# Patient Record
Sex: FEMALE | Race: WHITE | NOT HISPANIC OR LATINO | URBAN - METROPOLITAN AREA
[De-identification: names, ages, dates, MRNs, and addresses within clinical notes are randomized per-mention and may not be internally consistent; named-entity substitution may affect disease eponyms.]

---

## 2017-06-27 ENCOUNTER — HOSPITAL ENCOUNTER (OUTPATIENT)
Dept: OBGYN | Facility: HOSPITAL | Age: 32
Discharge: HOME | End: 2017-06-27
Attending: OBSTETRICS & GYNECOLOGY | Admitting: ADVANCED PRACTICE MIDWIFE

## 2017-06-27 RX ORDER — CLINDAMYCIN PHOSPHATE 10 MG/G
GEL TOPICAL 2 TIMES DAILY
Status: SHIPPED | COMMUNITY
Start: 2017-06-27

## 2017-06-27 RX ORDER — SWAB
1 SWAB, NON-MEDICATED MISCELLANEOUS DAILY
Status: SHIPPED | COMMUNITY
Start: 2017-06-27

## 2017-06-27 ASSESSMENT — MIFFLIN-ST. JEOR: SCORE: 1673.38

## 2017-07-31 ENCOUNTER — HOSPITAL ENCOUNTER (OUTPATIENT)
Dept: OBGYN | Facility: HOSPITAL | Age: 32
Discharge: HOME | End: 2017-07-31
Attending: OBSTETRICS & GYNECOLOGY | Admitting: OBSTETRICS & GYNECOLOGY

## 2017-07-31 ASSESSMENT — MIFFLIN-ST. JEOR: SCORE: 1700.59

## 2017-08-04 ENCOUNTER — RECORDS - HEALTHEAST (OUTPATIENT)
Dept: ADMINISTRATIVE | Facility: OTHER | Age: 32
End: 2017-08-04

## 2017-08-10 ENCOUNTER — RECORDS - HEALTHEAST (OUTPATIENT)
Dept: ADMINISTRATIVE | Facility: OTHER | Age: 32
End: 2017-08-10

## 2017-08-24 ENCOUNTER — ANESTHESIA - HEALTHEAST (OUTPATIENT)
Dept: OBGYN | Facility: HOSPITAL | Age: 32
End: 2017-08-24

## 2017-08-25 ENCOUNTER — SURGERY - HEALTHEAST (OUTPATIENT)
Dept: OBGYN | Facility: HOSPITAL | Age: 32
End: 2017-08-25

## 2017-08-25 ASSESSMENT — MIFFLIN-ST. JEOR: SCORE: 1759.56

## 2017-08-29 ENCOUNTER — COMMUNICATION - HEALTHEAST (OUTPATIENT)
Dept: OBGYN | Facility: HOSPITAL | Age: 32
End: 2017-08-29

## 2017-11-07 ENCOUNTER — COMMUNICATION - HEALTHEAST (OUTPATIENT)
Dept: FAMILY MEDICINE | Facility: CLINIC | Age: 32
End: 2017-11-07

## 2017-11-15 ENCOUNTER — OFFICE VISIT - HEALTHEAST (OUTPATIENT)
Dept: FAMILY MEDICINE | Facility: CLINIC | Age: 32
End: 2017-11-15

## 2017-11-15 DIAGNOSIS — N89.8 VAGINAL CYST: ICD-10-CM

## 2017-11-15 DIAGNOSIS — Z01.818 PREOP EXAMINATION: ICD-10-CM

## 2017-11-15 ASSESSMENT — MIFFLIN-ST. JEOR: SCORE: 1579.26

## 2021-05-31 VITALS — HEIGHT: 64 IN | WEIGHT: 238 LBS | BODY MASS INDEX: 40.63 KG/M2

## 2021-05-31 VITALS — WEIGHT: 219 LBS | HEIGHT: 64 IN | BODY MASS INDEX: 37.39 KG/M2

## 2021-05-31 VITALS — HEIGHT: 64 IN | BODY MASS INDEX: 38.41 KG/M2 | WEIGHT: 225 LBS

## 2021-05-31 VITALS — WEIGHT: 198.25 LBS | HEIGHT: 64 IN | BODY MASS INDEX: 33.84 KG/M2

## 2021-06-12 NOTE — ANESTHESIA POSTPROCEDURE EVALUATION
Patient: Sahara Rios  PRIMARY  SECTION  Anesthesia type: spinal    Patient location: [facility]  Last vitals:   Vitals:    17 0748   BP: 124/67   Pulse: (!) 58   Resp: 20   Temp: 36.7  C (98.1  F)   SpO2: 98%     Post vital signs: stable  Level of consciousness: awake and responds to simple questions  Post-anesthesia pain: pain controlled  Post-anesthesia nausea and vomiting: no  Pulmonary: unassisted, return to baseline  Cardiovascular: stable and blood pressure at baseline  Hydration: adequate  Anesthetic events: no    QCDR Measures:  ASA# 11 - Melinda-op Cardiac Arrest: ASA11B - Patient did NOT experience unanticipated cardiac arrest  ASA# 12 - Melinda-op Mortality Rate: ASA12B - Patient did NOT die  ASA# 13 - PACU Re-Intubation Rate: ASA13B - Patient did NOT require a new airway mgmt  ASA# 10 - Composite Anes Safety: ASA10A - No serious adverse event  ASA# 38 - New Corneal Injury: ASA38A - No new exposure keratitis or corneal abrasion in PACU    Additional Notes:

## 2021-06-12 NOTE — ANESTHESIA PROCEDURE NOTES
Spinal Block    Start time: 8/25/2017 7:40 AM  End time: 8/25/2017 7:42 AM  Reason for block: labor epidural    Staffing:  Performing  Anesthesiologist: CASEY VILLASENOR    Preanesthetic Checklist  Completed: patient identified, risks, benefits, and alternatives discussed, timeout performed, consent obtained, at patient's request, airway assessed, oxygen available, suction available, emergency drugs available and hand hygiene performed  Spinal Block  Patient position: sitting  Prep: ChloraPrep  Patient monitoring: heart rate, cardiac monitor, continuous pulse ox and blood pressure  Approach: right paramedian  Location: L3-4  Injection technique: single-shot  Needle type: pencil-tip   Needle gauge: 24 G    Assessment  Sensory level: T8    Additional Notes:  stone tay

## 2021-06-12 NOTE — ANESTHESIA PREPROCEDURE EVALUATION
Anesthesia Evaluation      Patient summary reviewed   No history of anesthetic complications     Airway   Mallampati: II  Neck ROM: full   Pulmonary - negative ROS and normal exam                          Cardiovascular - normal exam  Exercise tolerance: > or = 4 METS  Rhythm: regular  Rate: normal,         Neuro/Psych - negative ROS     Endo/Other    (+) obesity, pregnant     GI/Hepatic/Renal - negative ROS           Dental - normal exam                        Anesthesia Plan  Planned anesthetic: spinal    ASA 2     Anesthetic plan and risks discussed with: patient    Post-op plan: routine recovery

## 2021-06-12 NOTE — ANESTHESIA CARE TRANSFER NOTE
Last vitals:   Vitals:    08/25/17 0857   BP: 115/66   Pulse: 72   Resp: 16   Temp: 36.4  C (97.5  F)     Patient's level of consciousness is awake  Spontaneous respirations: yes  Maintains airway independently: yes  Dentition unchanged: yes  Oropharynx: oropharynx clear of all foreign objects    QCDR Measures:  ASA# 20 - Surgical Safety Checklist: WHO surgical safety checklist completed prior to induction  PQRS# 430 - Adult PONV Prevention: 4558F - Pt received => 2 anti-emetic agents (different classes) preop & intraop  ASA# 8 - Peds PONV Prevention: NA - Not pediatric patient, not GA or 2 or more risk factors NOT present  PQRS# 424 - Melinda-op Temp Management: 4559F - At least one body temp DOCUMENTED => 35.5C or 95.9F within required timeframe  PQRS# 426 - PACU Transfer Protocol: - Transfer of care checklist used  ASA# 14 - Acute Post-op Pain: ASA14B - Patient did NOT experience pain >= 7 out of 10

## 2021-06-14 NOTE — PROGRESS NOTES
PREOPERATIVE HISTORY AND PHYSICAL EXAM   Patient: Sahara Rios   1985       MRN [id number] PCP: Mya De Leon MD  CLINIC PHONE: [phone]       SUBJECTIVE  Patient is being seen today for Preoperative Consultation at the request of Dr. Tong for the underlying condition of vaginal cyst.  Surgery Date:  17  Scheduled Surgery: cyst removal  Surgery Location: [facility]    Exam Date:  11/15/2017  Primary Provider: Mya De Leon MD    HPI:   Sahraa Rios is a 32 y.o. female here for preop exam.   She is a patient of Dr. De Leon.  PMH notable for allergies, pilonidal cyst, hidradenitis, otherwise negative.  She gave birth in August via  section and has been doing well.  Returns to work next week.     PAST MEDICAL:   Patient Active Problem List    Diagnosis Date Noted      delivery, delivered, current hospitalization 2017     Closed Fracture Of The Left Thumb MCP Joint      Overview Note:     Created by Conversion         Finger Injury      Overview Note:     Created by Conversion  [employer] Annotation: 2014  9:16AM - Юлия Barkeris: left thumb         Myalgia And Myositis      Overview Note:     Created by Conversion         Adenoid Hypertrophy      Overview Note:     Created by Conversion         Hypertrophied Nasal Turbinate      Overview Note:     Created by Conversion         Coccydynia      Overview Note:     Created by Conversion         Allergic Rhinitis      Overview Note:     Created by Conversion  [facility] Annotation: Sep 25 2011  7:20PM - Юлия Barkeris: decreased nasal   sx since septoplasty    Replacement Utility updated for latest IMO load       Pilonidal Cyst      Overview Note:     Created by Conversion    Replacement Utility updated for latest IMO load       Acute Sore Throat      Overview Note:     Created by Conversion         Acute Upper Respiratory Infection      Overview Note:     Created by  Conversion    Replacement Utility updated for latest IMO load       Pap Smear (+) Low Grade Squamous Intraepithelial Lesion      Overview Note:     Created by Conversion           PAST SURGICAL:     Past Surgical History:   Procedure Laterality Date      SECTION N/A 2017    Procedure: PRIMARY  SECTION;  Surgeon: Alona Tong MD;  Location: [facility];  Service:      DE REMOVAL ADENOIDS,PRIMARY,<13 Y/O      Description: Adenoidectomy;  Recorded: 2013;     DE REMV PILONIDAL LESION SIMPLE      Description: Pilonidal Cyst Resection;  Recorded: 2014;     DE REPAIR OF NASAL SEPTUM      Description: Septoplasty;  Recorded: 2011;       Personal or Family History of Anesthesia Reactions: none  Personal or Family History of Bleeding Disorders: none    IMMUNIZATIONS  Immunization History   Administered Date(s) Administered     Hep B, historic 1900     Pneumo Polysac 23-V 2011     Tdap 2011, 2017       CURRENT MEDICATIONS   Current Outpatient Prescriptions   Medication Sig Dispense Refill     prenatal vitamin iron-folic acid 27mg-0.8mg (PRENATAL S) 27 mg iron- 800 mcg Tab tablet Take 1 tablet by mouth daily.       clindamycin (CLINDAGEL) 1 % gel Apply topically 2 (two) times a day.       docusate sodium (COLACE) 50 MG capsule Take by mouth 2 (two) times a day.       oxyCODONE-acetaminophen (PERCOCET) 5-325 mg per tablet Take 1-2 tablets by mouth every 4 (four) hours as needed. 30 tablet 0     pantoprazole (PROTONIX) 20 MG tablet Take 20 mg by mouth daily.       No current facility-administered medications for this visit.        Recent NSAID/Aspirin use: none    ALLERGIES  No latex allergies.  Allergies   Allergen Reactions     Trazodone Hives       HABITS & SOCIAL HISTORY:  She  reports that she has been smoking Cigarettes.  She has a 2.00 pack-year smoking history. She has quit using smokeless tobacco.  She  reports that she does not drink  "alcohol.  History   Sexual Activity     Sexual activity: Yes     Partners: Male     Birth control/ protection: None       FAMILY HISTORY:  Her family history is not on file.      REVIEW OF SYSTEMS:  Remainder of complete head to toe negative.   CARDIOVASCULAR: Negative for CAD, CHF, Valvular Disease, HTN, Atrial Fibrillation, Other Arrhythmia, ICD/Pacer, Peripheral Vascular Disease.  PULMONARY: Negative for COPD, Asthma, Apnea.  RENAL: Negative for Chronic diuretic use, Renal Failure, Dialysis.  ENDOCRINE: Negative for Diabetes, Chronic steroid use, Thyroid Disease.  GI: Negative for GERD, Hiatal Hernia, Hepatic Disease.  NEURO: Negative for CVA, TIA, Seizures, Neuropathy.  HEMATOLOGIC DISEASE: Negative for Clotting Disorder, Bleeding Disorder, Anemia.  MUSCULOSKELETAL: Negative for Arthritis, Muscular Dystrophy.    PHYSICAL EXAMINATION  /60 (Patient Site: Left Arm, Patient Position: Sitting, Cuff Size: Adult Large)  Pulse 92  Temp 98.5  F (36.9  C) (Oral)   Resp 16  Ht 5' 4\" (1.626 m)  Wt 198 lb 4 oz (89.9 kg)  LMP 11/01/2017 (Approximate)  Breastfeeding? No  BMI 34.03 kg/m2  CONSTITUTIONAL - Alert, well appearing  SKIN - No visual abnormalities  EYES - Normal conjunctiva and lids.  Extraocular movements intact.  Pupils equal, reactive to light and accomodation.  ENMT - Normal external auditory canal and tympanic membranes.  Normal oropharynx, including mucosa, palate, tongue, tonsils and posterior.  NECK - Symmetric, trachea midline, no masses. No thyromegaly  RESPIRATORY - Normal respiratory effort.  Clear to auscultation bilaterally.  CARDIAC - Regular rate and rhythm, normal S1 S2.  No murmurs rubs or gallops.  No edema or varicosities.  GI/ABDOMEN - Soft, Non-tender, non-distended, bowel sounds present.  No organomegaly  MUSCULOSKELETAL - Normal gait  NEUROLOGIC - Grossly normal  PSYCHIATRIC - Mood and affect bright and congruent. Oriented to time, place and person.    LYMPH/HEME - " Normal    DIAGNOSTICS:  EKG: Not indicated  CHEST XRAY:  Not indicated  LABS: as below  Urine Pregnancy: negative    Recent Results (from the past 240 hour(s))   Hemoglobin   Result Value Ref Range    Hemoglobin 14.6 12.0 - 16.0 g/dL   Pregnancy (Beta-hCG, Qual), Urine   Result Value Ref Range    Pregnancy Test, Urine Negative Negative    Specific Gravity, UA 1.015 1.001 - 1.030         ASSESSMENT & PLAN  1. Preop examination  - Hemoglobin  - Pregnancy (Beta-hCG, Qual), Urine    2. Vaginal cyst    Patient approved for surgery with general or local anesthesia.  Post-operative pain to be managed by Surgeon during post-operative timeframe.  Postoperative Care will be managed by Hospital Service, if admitted.  No Aspirin, NSAID's (Ibuprofen, Aleve) or Fish Oil one week before surgery.  Above recommendations were reviewed with patient.    Preoperative Cardiac Risk Stratification and Management   Cardiac risk assessment - Low.   Beta-blocker protocol - Not indicated  Final Disposition  Proceed with proposed surgery without additional clinical clarifications  No Cardiology consultation or non-invasive testing.   ___________________________________________    [facility], [place] [address]  PHONE: [phone], FAX: [phone]